# Patient Record
Sex: FEMALE | Race: BLACK OR AFRICAN AMERICAN | NOT HISPANIC OR LATINO | Employment: UNEMPLOYED | URBAN - METROPOLITAN AREA
[De-identification: names, ages, dates, MRNs, and addresses within clinical notes are randomized per-mention and may not be internally consistent; named-entity substitution may affect disease eponyms.]

---

## 2017-07-31 ENCOUNTER — TELEPHONE (OUTPATIENT)
Dept: OBSTETRICS AND GYNECOLOGY | Facility: CLINIC | Age: 37
End: 2017-07-31

## 2020-06-18 DIAGNOSIS — M79.604 PAIN IN RIGHT LEG: Primary | ICD-10-CM

## 2022-03-08 ENCOUNTER — HOSPITAL ENCOUNTER (EMERGENCY)
Facility: HOSPITAL | Age: 42
Discharge: HOME | End: 2022-03-08
Attending: EMERGENCY MEDICINE
Payer: MEDICAID

## 2022-03-08 VITALS
WEIGHT: 234 LBS | HEIGHT: 62 IN | RESPIRATION RATE: 17 BRPM | SYSTOLIC BLOOD PRESSURE: 144 MMHG | BODY MASS INDEX: 43.06 KG/M2 | TEMPERATURE: 99 F | DIASTOLIC BLOOD PRESSURE: 89 MMHG | HEART RATE: 66 BPM | OXYGEN SATURATION: 99 %

## 2022-03-08 DIAGNOSIS — N93.9 VAGINAL BLEEDING: Primary | ICD-10-CM

## 2022-03-08 DIAGNOSIS — R79.1 SUPRATHERAPEUTIC INR: ICD-10-CM

## 2022-03-08 PROBLEM — I63.9 CEREBROVASCULAR ACCIDENT (CVA): Status: ACTIVE | Noted: 2020-08-23

## 2022-03-08 PROBLEM — M32.9 LUPUS: Status: ACTIVE | Noted: 2022-03-08

## 2022-03-08 PROBLEM — I10 HYPERTENSION: Status: ACTIVE | Noted: 2021-12-06

## 2022-03-08 LAB
ALBUMIN SERPL-MCNC: 3.9 G/DL (ref 3.3–5.5)
ALP SERPL-CCNC: 88 U/L (ref 42–141)
BILIRUB SERPL-MCNC: 0.5 MG/DL (ref 0.2–1.6)
BILIRUBIN, POC UA: NEGATIVE
BLOOD, POC UA: ABNORMAL
BUN SERPL-MCNC: 9 MG/DL (ref 7–22)
CALCIUM SERPL-MCNC: 9.5 MG/DL (ref 8–10.3)
CHLORIDE SERPL-SCNC: 106 MMOL/L (ref 98–108)
CLARITY, POC UA: CLEAR
COLOR, POC UA: YELLOW
CREAT SERPL-MCNC: 1 MG/DL (ref 0.6–1.2)
GLUCOSE SERPL-MCNC: 105 MG/DL (ref 73–118)
GLUCOSE, POC UA: NEGATIVE
KETONES, POC UA: NEGATIVE
LEUKOCYTE EST, POC UA: NEGATIVE
NITRITE, POC UA: NEGATIVE
PH UR STRIP: 6 [PH]
POC ALT (SGPT): 23 U/L (ref 10–47)
POC AST (SGOT): 25 U/L (ref 11–38)
POC PTINR: 5.3 (ref 0.9–1.2)
POC PTWBT: 59.2 SEC (ref 9.7–14.3)
POC TCO2: 29 MMOL/L (ref 18–33)
POTASSIUM BLD-SCNC: 3.6 MMOL/L (ref 3.6–5.1)
PROTEIN, POC UA: ABNORMAL
PROTEIN, POC: 7.8 G/DL (ref 6.4–8.1)
SAMPLE: ABNORMAL
SODIUM BLD-SCNC: 139 MMOL/L (ref 128–145)
SPECIFIC GRAVITY, POC UA: 1.02
UROBILINOGEN, POC UA: 0.2 E.U./DL

## 2022-03-08 PROCEDURE — 85610 PROTHROMBIN TIME: CPT | Mod: ER

## 2022-03-08 PROCEDURE — 81003 URINALYSIS AUTO W/O SCOPE: CPT | Mod: ER

## 2022-03-08 PROCEDURE — 85025 COMPLETE CBC W/AUTO DIFF WBC: CPT | Mod: ER

## 2022-03-08 PROCEDURE — 99284 EMERGENCY DEPT VISIT MOD MDM: CPT | Mod: ER

## 2022-03-08 PROCEDURE — 80053 COMPREHEN METABOLIC PANEL: CPT | Mod: ER

## 2022-03-08 RX ORDER — LANOLIN ALCOHOL/MO/W.PET/CERES
CREAM (GRAM) TOPICAL
COMMUNITY

## 2022-03-08 RX ORDER — WARFARIN SODIUM 5 MG/1
5 TABLET ORAL DAILY
COMMUNITY

## 2022-03-08 RX ORDER — GABAPENTIN 300 MG/1
300 CAPSULE ORAL 2 TIMES DAILY PRN
COMMUNITY

## 2022-03-08 NOTE — ED PROVIDER NOTES
Encounter Date: 3/8/2022       History     Chief Complaint   Patient presents with    Vaginal Bleeding     Since 2300 vaginal bleeding after intercourse with .  States changed 4 pads today.  No dizzyness/N/V.  Abdominal cramping present along with pain to vagina.  HX of hysterectomy. Pt took her Warfarin today.      CC: Vaginal bleeding    HPI:  This is a 41-year-old female with history of CVA and lupus on Coumadin therapy presenting to the ED with vaginal bleeding that began last night after having intercourse with her .  She reports using 4 pads today.  She reports mild associated lower pelvic cramping and vaginal pain.  Denies any headache, visual disturbance, chest pain, shortness of breath, lightheadedness, dizziness.  Reports history of supracervical hysterectomy.  OBGYN is Dr. Anderson at [facility].    The history is provided by the patient. No  was used.     Review of patient's allergies indicates:  No Known Allergies  Past Medical History:   Diagnosis Date    Endometriosis     Hypertension     Lupus      Past Surgical History:   Procedure Laterality Date     SECTION      x2    CHOLECYSTECTOMY      HAND SURGERY      HYSTERECTOMY      partial done for AUB     No family history on file.  Social History     Tobacco Use    Smoking status: Current Every Day Smoker     Packs/day: 0.25   Substance Use Topics    Alcohol use: No    Drug use: No     Review of Systems   Constitutional: Negative for chills and fever.   HENT: Negative for sore throat.    Respiratory: Negative for shortness of breath.    Cardiovascular: Negative for chest pain.   Gastrointestinal: Negative for abdominal pain, nausea and vomiting.   Genitourinary: Positive for pelvic pain, vaginal bleeding and vaginal pain. Negative for dysuria.   Musculoskeletal: Negative for back pain.   Skin: Negative for rash.   Neurological: Negative for weakness.   Hematological: Does not  bruise/bleed easily.       Physical Exam     Initial Vitals [03/08/22 1541]   BP Pulse Resp Temp SpO2   (!) 153/98 76 18 98.4 °F (36.9 °C) 100 %      MAP       --         Physical Exam    Constitutional: She appears well-developed and well-nourished. She is not diaphoretic. No distress.   HENT:   Head: Normocephalic and atraumatic.   Neck:   Normal range of motion.  Cardiovascular: Normal rate, regular rhythm, normal heart sounds and intact distal pulses.   Pulmonary/Chest: Breath sounds normal. No respiratory distress.   Abdominal: Abdomen is soft. Bowel sounds are normal. There is no abdominal tenderness.   Abdomen is soft nontender.  No guarding or rigidity.   Genitourinary: There is no injury on the right labia. There is no injury on the left labia.    Vaginal bleeding present.   There is bleeding in the vagina.    No foreign body in the vagina.      Genitourinary Comments: Scant bleeding in the vagina.  No clots, tissue, pooling of blood.  No lacerations observed.  Cervix appears intact with no bleeding from the os.   exam chaperoned by RN and Dr. Dan.     Musculoskeletal:         General: Normal range of motion.      Cervical back: Normal range of motion.     Neurological: She is alert and oriented to person, place, and time.   Skin: Skin is warm and dry.   Psychiatric: She has a normal mood and affect. Her behavior is normal.         ED Course   Procedures  Labs Reviewed   POCT URINALYSIS W/O SCOPE - Abnormal; Notable for the following components:       Result Value    Blood, UA 3+ (*)     Protein, UA 1+ (*)     All other components within normal limits   ISTAT PROCEDURE - Abnormal; Notable for the following components:    POC PTWBT 59.2 (*)     POC PTINR 5.3 (*)     All other components within normal limits   POCT CBC   POCT URINALYSIS W/O SCOPE   POCT CMP   POCT PROTIME-INR   POCT CMP          Imaging Results    None          Medications - No data to display  Medical Decision Making:   ED  Management:  41-year-old female on Coumadin presenting to the ED with vaginal bleeding after having intercourse yesterday.  Her abdominal exam is reassuring.   exam with scant bleeding.  No lacerations or obvious injury visualized.  CBC without anemia.  Her INR is supratherapeutic at 5.3.  Her goal is 2-3.  She will hold her dose tomorrow and contact her Coumadin Clinic for further instruction.  I discussed the case with OBGYN on-call Dr. France who believes this patient is stable for discharge with follow-up tomorrow with her OBGYN.  Patient is agreeable to the plan of care.  Strict return precautions discussed with the patient who verbalized understanding.    This case was discussed with my attending physician who examined the patient and agrees with my plan of care.                      Clinical Impression:   Final diagnoses:  [N93.9] Vaginal bleeding (Primary)  [R79.1] Supratherapeutic INR          ED Disposition Condition    Discharge Stable        ED Prescriptions     None        Follow-up Information     Follow up With Specialties Details Why Contact Info    Yuliana Beal MD Internal Medicine, Pediatrics Schedule an appointment as soon as possible for a visit in 1 day For follow-up [address]   [address]  [facility] [address]  [phone]      Brittany Anderson MD Obstetrics and Gynecology Schedule an appointment as soon as possible for a visit in 1 day For follow-up-----vaginal bleeding [address]  [address]  [facility] [address]  [phone]      [facility] - Endo/Diabetes/Coumadin Endocrinology, Nephrology Schedule an appointment as soon as possible for a visit in 1 day For follow-up----elevated INR [address] [facility] [address]  [phone]      [facility] ED Emergency Medicine Go to  If symptoms worsen [address]  [facility] [address] [phone]           Donita Piedra NP  03/08/22 2108       Donita Piedra NP  03/08/22  2109

## 2022-03-08 NOTE — DISCHARGE INSTRUCTIONS
Hold your dose of Coumadin tomorrow.  Call your Coumadin clinic and informed them of your INR level (5.3) in the ED.  Call your OBGYN in the morning to have a follow up appointment tomorrow to re-evaluate symptoms.  Do not have sex or insert anything into the vagina.  Return to the emergency department for any new or worsening symptoms such as heavier vaginal bleeding, lightheadedness, dizziness, worsening pain, or any other concerns.

## 2022-05-31 ENCOUNTER — HOSPITAL ENCOUNTER (EMERGENCY)
Facility: HOSPITAL | Age: 42
Discharge: HOME | End: 2022-05-31
Attending: EMERGENCY MEDICINE
Payer: MEDICAID

## 2022-05-31 VITALS
HEIGHT: 62 IN | DIASTOLIC BLOOD PRESSURE: 82 MMHG | HEART RATE: 83 BPM | BODY MASS INDEX: 43.24 KG/M2 | SYSTOLIC BLOOD PRESSURE: 136 MMHG | WEIGHT: 235 LBS | RESPIRATION RATE: 16 BRPM | TEMPERATURE: 99 F | OXYGEN SATURATION: 98 %

## 2022-05-31 DIAGNOSIS — N34.2: Primary | ICD-10-CM

## 2022-05-31 LAB
B-HCG UR QL: NEGATIVE
CTP QC/QA: YES

## 2022-05-31 PROCEDURE — 63600175 PHARM REV CODE 636 W HCPCS: Mod: ER | Performed by: EMERGENCY MEDICINE

## 2022-05-31 PROCEDURE — 25000003 PHARM REV CODE 250: Mod: ER | Performed by: EMERGENCY MEDICINE

## 2022-05-31 PROCEDURE — 81025 URINE PREGNANCY TEST: CPT | Mod: ER | Performed by: NURSE PRACTITIONER

## 2022-05-31 PROCEDURE — 99284 EMERGENCY DEPT VISIT MOD MDM: CPT | Mod: ER

## 2022-05-31 PROCEDURE — 96372 THER/PROPH/DIAG INJ SC/IM: CPT | Performed by: EMERGENCY MEDICINE

## 2022-05-31 RX ORDER — DOXYCYCLINE HYCLATE 100 MG
100 TABLET ORAL
Status: COMPLETED | OUTPATIENT
Start: 2022-05-31 | End: 2022-05-31

## 2022-05-31 RX ORDER — DOXYCYCLINE 100 MG/1
100 CAPSULE ORAL 2 TIMES DAILY
Qty: 20 CAPSULE | Refills: 0 | Status: SHIPPED | OUTPATIENT
Start: 2022-05-31 | End: 2022-06-10

## 2022-05-31 RX ORDER — CEFTRIAXONE 500 MG/1
500 INJECTION, POWDER, FOR SOLUTION INTRAMUSCULAR; INTRAVENOUS
Status: COMPLETED | OUTPATIENT
Start: 2022-05-31 | End: 2022-05-31

## 2022-05-31 RX ADMIN — CEFTRIAXONE SODIUM 500 MG: 500 INJECTION, POWDER, FOR SOLUTION INTRAMUSCULAR; INTRAVENOUS at 11:05

## 2022-05-31 RX ADMIN — DOXYCYCLINE HYCLATE 100 MG: 100 TABLET, FILM COATED ORAL at 11:05

## 2022-05-31 NOTE — ED PROVIDER NOTES
Encounter Date: 2022    SCRIBE #1 NOTE: I, Sadaf Swan, am scribing for, and in the presence of,  Ruben Hanna MD. I have scribed the following portions of the note - Other sections scribed: HPI, ROS, PE.       History     Chief Complaint   Patient presents with    Groin Swelling     Swelling started yesterday right side of vagina, no burning , swollen, tender and painful, thinks it reaction from mupirocin. Was told to use cream for cyst that she has on vagina, has had cyst for 3 weeks.      Karla Cruz is a 41 y.o. female with a history of Lupus, Hypertension and Endometriosis who presents to the ED for evaluation of a cyst on the vagina which became irritated yesterday. The patient reports that the cyst began 5 weeks ago and she visited her primary care provider 3 weeks ago. She stated that she was prescribed topical medication for the cyst. Additionally, she reports associated symptoms of worsening swelling around the cyst and dysuria. Denies vaginal discharge and suprapubic abdominal pain.The patients attests to recent negative STD tests from her primary care provider. No other alleviating or exacerbating factors. No known allergies.       The history is provided by the patient. No  was used.     Review of patient's allergies indicates:  No Known Allergies  Past Medical History:   Diagnosis Date    Endometriosis     Hypertension     Lupus      Past Surgical History:   Procedure Laterality Date     SECTION      x2    CHOLECYSTECTOMY      HAND SURGERY      HYSTERECTOMY      partial done for AUB     History reviewed. No pertinent family history.  Social History     Tobacco Use    Smoking status: Current Every Day Smoker     Packs/day: 0.25   Substance Use Topics    Alcohol use: No    Drug use: No     Review of Systems   Constitutional: Negative for fever.   HENT: Negative for sore throat.    Eyes: Negative for visual disturbance.   Respiratory:  Negative for shortness of breath.    Cardiovascular: Negative for chest pain.   Gastrointestinal: Negative for abdominal pain.   Genitourinary: Positive for dysuria. Negative for difficulty urinating and vaginal discharge.        (+) Cyst with swelling.   Musculoskeletal: Negative for back pain.   Skin: Negative for rash.   Neurological: Negative for headaches.       Physical Exam     Initial Vitals [05/31/22 0935]   BP Pulse Resp Temp SpO2   (!) 145/85 85 19 98.8 °F (37.1 °C) 97 %      MAP       --         Physical Exam    Constitutional: She appears well-developed and well-nourished.   HENT:   Head: Normocephalic and atraumatic.   Eyes: EOM are normal. Pupils are equal, round, and reactive to light.   Neck: Neck supple. No thyromegaly present. No JVD present.   Normal range of motion.  Cardiovascular: Normal rate and regular rhythm. Exam reveals no gallop and no friction rub.    No murmur heard.  Abdominal: Abdomen is soft. There is no abdominal tenderness.   Genitourinary:    Genitourinary Comments: No vaginal discharge. Right skene's gland swollen and tender. Sub centimeter. Non fluctuant.      Musculoskeletal:      Cervical back: Normal range of motion and neck supple.     Neurological: She is alert and oriented to person, place, and time. She has normal strength. GCS eye subscore is 4. GCS verbal subscore is 5. GCS motor subscore is 6.   Skin: Skin is warm and dry. Capillary refill takes less than 2 seconds.         ED Course   Procedures  Labs Reviewed   POCT URINE PREGNANCY          Imaging Results    None          Medications   cefTRIAXone injection 500 mg (500 mg Intramuscular Given 5/31/22 1110)   doxycycline tablet 100 mg (100 mg Oral Given 5/31/22 1109)     Medical Decision Making:   History:   Old Medical Records: I decided to obtain old medical records.  Clinical Tests:   Lab Tests: Ordered and Reviewed          Scribe Attestation:   Scribe #1: I performed the above scribed service and the  documentation accurately describes the services I performed. I attest to the accuracy of the note.               I, Ruben Hanna, personally performed the services described in this documentation. All medical record entries made by the scribe were at my direction and in my presence.  I have reviewed the chart and agree that the record reflects my personal performance and is accurate and complete    Clinical Impression:   Final diagnoses:  [N34.2] [place]'s gland inflammation (Primary)          ED Disposition Condition    Discharge Stable        ED Prescriptions     None        Follow-up Information     Follow up With Specialties Details Why Contact Info    Brittany Anderson MD Obstetrics and Gynecology Schedule an appointment as soon as possible for a visit   [address] [place] [address]  [address]  [facility] [address]  [phone]             Ruben Hanna MD  05/31/22 1133

## 2022-07-20 ENCOUNTER — HOSPITAL ENCOUNTER (EMERGENCY)
Facility: HOSPITAL | Age: 42
Discharge: HOME | End: 2022-07-20
Attending: EMERGENCY MEDICINE
Payer: MEDICAID

## 2022-07-20 VITALS
OXYGEN SATURATION: 99 % | SYSTOLIC BLOOD PRESSURE: 154 MMHG | HEIGHT: 62 IN | HEART RATE: 79 BPM | WEIGHT: 223 LBS | DIASTOLIC BLOOD PRESSURE: 98 MMHG | BODY MASS INDEX: 41.04 KG/M2 | RESPIRATION RATE: 18 BRPM | TEMPERATURE: 98 F

## 2022-07-20 DIAGNOSIS — R21 RASH: Primary | ICD-10-CM

## 2022-07-20 DIAGNOSIS — L29.9 PRURITUS: ICD-10-CM

## 2022-07-20 PROCEDURE — 96372 THER/PROPH/DIAG INJ SC/IM: CPT | Performed by: EMERGENCY MEDICINE

## 2022-07-20 PROCEDURE — 99284 EMERGENCY DEPT VISIT MOD MDM: CPT | Mod: ER

## 2022-07-20 PROCEDURE — 63600175 PHARM REV CODE 636 W HCPCS: Mod: ER | Performed by: EMERGENCY MEDICINE

## 2022-07-20 RX ORDER — LEVOCETIRIZINE DIHYDROCHLORIDE 5 MG/1
5 TABLET, FILM COATED ORAL NIGHTLY
Qty: 14 TABLET | Refills: 0 | Status: SHIPPED | OUTPATIENT
Start: 2022-07-20 | End: 2022-08-03

## 2022-07-20 RX ORDER — DEXAMETHASONE SODIUM PHOSPHATE 4 MG/ML
8 INJECTION, SOLUTION INTRA-ARTICULAR; INTRALESIONAL; INTRAMUSCULAR; INTRAVENOUS; SOFT TISSUE
Status: COMPLETED | OUTPATIENT
Start: 2022-07-20 | End: 2022-07-20

## 2022-07-20 RX ORDER — PERMETHRIN 50 MG/G
CREAM TOPICAL
Qty: 90 G | Refills: 0 | Status: SHIPPED | OUTPATIENT
Start: 2022-07-20

## 2022-07-20 RX ADMIN — DEXAMETHASONE SODIUM PHOSPHATE 8 MG: 4 INJECTION INTRA-ARTICULAR; INTRALESIONAL; INTRAMUSCULAR; INTRAVENOUS; SOFT TISSUE at 07:07

## 2022-07-20 NOTE — DISCHARGE INSTRUCTIONS
The cause of your rash is uncertain.  You were treated with steroids and have 2 medications for home use.  If you are not better with this treatment, see your primary care doctor.

## 2022-07-20 NOTE — ED PROVIDER NOTES
Encounter Date: 2022    SCRIBE #1 NOTE: I, Manohar Hanson, am scribing for, and in the presence of,  Albertina Greenberg MD. I have scribed the following portions of the note - Other sections scribed: HPI,ROS,PE.       History     Chief Complaint   Patient presents with    Rash     Reports rash to torso and arms for 3 days after spending night at friends house.     Patient is a 41 year old female with PMHx of HTN who presents to ED with complaints of an acute rash to the torso, arms, and various other areas onset 3 days ago. She notes rash is itchy and appeared all at once after she spent a day at her friend's house. She states that her friend doesn't have any pets. Has not taken any medication for relief of symptoms. She used to smoke tobacco but notes that she has quit. No other complaints at this time.     The history is provided by the patient. No  was used.     Review of patient's allergies indicates:  No Known Allergies  Past Medical History:   Diagnosis Date    Endometriosis     Hypertension     Lupus      Past Surgical History:   Procedure Laterality Date     SECTION      x2    CHOLECYSTECTOMY      HAND SURGERY      HYSTERECTOMY      partial done for AUB     No family history on file.  Social History     Tobacco Use    Smoking status: Current Every Day Smoker     Packs/day: 0.25   Substance Use Topics    Alcohol use: No    Drug use: No     Review of Systems   Constitutional: Negative for activity change, appetite change, chills and fever.   HENT: Negative for congestion, rhinorrhea, sneezing and sore throat.    Respiratory: Negative for cough, choking, shortness of breath and wheezing.    Cardiovascular: Negative for chest pain and palpitations.   Gastrointestinal: Negative for abdominal pain, diarrhea, nausea and vomiting.   Skin: Positive for rash.   Neurological: Negative for dizziness, syncope, light-headedness and headaches.   All other systems reviewed and are  negative.      Physical Exam     Initial Vitals [07/20/22 0634]   BP Pulse Resp Temp SpO2   (!) 154/98 79 18 98.3 °F (36.8 °C) 99 %      MAP       --         Physical Exam    Nursing note and vitals reviewed.  Constitutional: She appears well-developed and well-nourished. No distress.   HENT:   Head: Normocephalic and atraumatic.   Eyes: Conjunctivae are normal.   Neck:   Normal range of motion.  Cardiovascular: Normal rate, regular rhythm and normal heart sounds.   No murmur heard.  Pulmonary/Chest: Breath sounds normal. No respiratory distress.   Abdominal: Bowel sounds are normal. She exhibits no distension.   Musculoskeletal:         General: Normal range of motion.      Cervical back: Normal range of motion.     Neurological: She is alert and oriented to person, place, and time.   Skin: Skin is warm and dry. Rash noted. Rash is papular.   Scattered papular lesions that are more concentrated on the trunk but also present on other extremities.   Psychiatric: She has a normal mood and affect. Her behavior is normal.         ED Course   Procedures  Labs Reviewed - No data to display       Imaging Results    None          Medications   dexamethasone injection 8 mg (8 mg Intramuscular Given 7/20/22 0736)     Medical Decision Making:   History:   Old Medical Records: I decided to obtain old medical records.  ED Management:  Non-specific pruritic rash - will treat IM steroids, oral antihistamine, and topical benadryl or steroid.  Follow up as needed.          Scribe Attestation:   Scribe #1: I performed the above scribed service and the documentation accurately describes the services I performed. I attest to the accuracy of the note.                 I, Dr. Albertina Greenberg, personally performed the services described in this documentation.   All medical record entries made by the scribe were at my direction and in my presence.   I have reviewed the chart and agree that the record is accurate and complete.   Albertina Greenberg MD.   7:31 AM 07/20/2022   Clinical Impression:   Final diagnoses:  [R21] Rash (Primary)  [L29.9] Pruritus          ED Disposition Condition    Discharge Stable        ED Prescriptions     Medication Sig Dispense Start Date End Date Auth. Provider    levocetirizine (XYZAL) 5 MG tablet Take 1 tablet (5 mg total) by mouth every evening. for 14 days 14 tablet 7/20/2022 8/3/2022 Albertina Greenberg MD    permethrin (ELIMITE) 5 % cream Apply to affected area once.  Leave on for 8-10 hours then wash off.  Repeat in 1 week if needed. 90 g 7/20/2022  Albertina Greenberg MD        Follow-up Information     Follow up With Specialties Details Why Contact Info    Yuliana Beal MD Internal Medicine, Pediatrics In 1 week if not better [address]  [facility] [address]  [phone]             Albertina Greenberg MD  07/20/22 1455

## 2023-04-19 ENCOUNTER — HOSPITAL ENCOUNTER (EMERGENCY)
Facility: HOSPITAL | Age: 43
Discharge: HOME | End: 2023-04-19
Attending: EMERGENCY MEDICINE
Payer: MEDICAID

## 2023-04-19 VITALS
HEART RATE: 72 BPM | SYSTOLIC BLOOD PRESSURE: 156 MMHG | BODY MASS INDEX: 33.31 KG/M2 | DIASTOLIC BLOOD PRESSURE: 97 MMHG | WEIGHT: 188 LBS | OXYGEN SATURATION: 96 % | RESPIRATION RATE: 18 BRPM | HEIGHT: 63 IN | TEMPERATURE: 99 F

## 2023-04-19 DIAGNOSIS — N64.4 BREAST PAIN, RIGHT: Primary | ICD-10-CM

## 2023-04-19 DIAGNOSIS — N61.0 MASTITIS: ICD-10-CM

## 2023-04-19 DIAGNOSIS — J02.9 SORE THROAT: ICD-10-CM

## 2023-04-19 LAB
CTP QC/QA: YES
MOLECULAR STREP A: NEGATIVE

## 2023-04-19 PROCEDURE — 87651 STREP A DNA AMP PROBE: CPT

## 2023-04-19 PROCEDURE — 99284 EMERGENCY DEPT VISIT MOD MDM: CPT

## 2023-04-19 PROCEDURE — 25000003 PHARM REV CODE 250: Performed by: NURSE PRACTITIONER

## 2023-04-19 RX ORDER — AMOXICILLIN AND CLAVULANATE POTASSIUM 875; 125 MG/1; MG/1
1 TABLET, FILM COATED ORAL
Status: COMPLETED | OUTPATIENT
Start: 2023-04-19 | End: 2023-04-19

## 2023-04-19 RX ORDER — AMOXICILLIN AND CLAVULANATE POTASSIUM 875; 125 MG/1; MG/1
1 TABLET, FILM COATED ORAL 2 TIMES DAILY
Qty: 28 TABLET | Refills: 0 | Status: SHIPPED | OUTPATIENT
Start: 2023-04-19 | End: 2023-05-03

## 2023-04-19 RX ORDER — LIDOCAINE HYDROCHLORIDE 20 MG/ML
15 SOLUTION OROPHARYNGEAL ONCE
Status: COMPLETED | OUTPATIENT
Start: 2023-04-19 | End: 2023-04-19

## 2023-04-19 RX ORDER — MAG HYDROX/ALUMINUM HYD/SIMETH 200-200-20
30 SUSPENSION, ORAL (FINAL DOSE FORM) ORAL ONCE
Status: COMPLETED | OUTPATIENT
Start: 2023-04-19 | End: 2023-04-19

## 2023-04-19 RX ORDER — LIDOCAINE 50 MG/G
1 PATCH TOPICAL
Status: DISCONTINUED | OUTPATIENT
Start: 2023-04-19 | End: 2023-04-19 | Stop reason: HOSPADM

## 2023-04-19 RX ORDER — LIDOCAINE 50 MG/G
1 PATCH TOPICAL DAILY
Qty: 15 PATCH | Refills: 0 | Status: SHIPPED | OUTPATIENT
Start: 2023-04-19

## 2023-04-19 RX ADMIN — LIDOCAINE HYDROCHLORIDE 15 ML: 20 SOLUTION ORAL; TOPICAL at 06:04

## 2023-04-19 RX ADMIN — AMOXICILLIN AND CLAVULANATE POTASSIUM 1 TABLET: 875; 125 TABLET, FILM COATED ORAL at 07:04

## 2023-04-19 RX ADMIN — ALUMINUM HYDROXIDE, MAGNESIUM HYDROXIDE, AND DIMETHICONE 30 ML: 200; 20; 200 SUSPENSION ORAL at 06:04

## 2023-04-19 RX ADMIN — LIDOCAINE 1 PATCH: 50 PATCH TOPICAL at 07:04

## 2023-04-19 NOTE — ED TRIAGE NOTES
43 yo female presents to ED with c/o right breast pain (around the nipple) and throat pain. Pt reports that she began having nipple pain around 2200 last night; reports throat pain for 2 days. Pt describes pain as sharp; rates pain at an 8 on a PRS of 0-10. Pt reports a PMH of lupus and HTN; also states that she had milk deposits in her breasts that were lumpy and painful a year ago. Pt denies any CP, SOB, cough, N/V/D, or anyb other symptoms.

## 2023-04-19 NOTE — ED PROVIDER NOTES
Encounter Date: 4/19/2023    SCRIBE #1 NOTE: I, Linda Sprague, am scribing for, and in the presence of,  Alejandra Adams NP. I have scribed the following portions of the note - Other sections scribed: HPI, ROS.     History     Chief Complaint   Patient presents with    Breast Pain    Sore Throat     Pt presents to ED c/o right breast pain area the nipple area, described as shooting started around 2200 last night.  Denies cp, sob, or any other associated symptoms.  Pt reports taking Tylenol last night with some relief.    Pt also c/o sore throat x 2 days.  Denies cough, congestion, fever, chills or any other symptoms.   Pt reports on blood thinners for stroke 8/2021.  Hx of lupus and htn.     Pt reports her grandmother passed away this morning.       CC: right nipple pain    HPI: This is a 42 y.o.female patient, with a PMHx of HTN, Endometriosis, and Lupus, presenting to the ED for further evaluation of non-radiating right nipple pain beginning last night at 10:00 PM. Patient reports associated shooting pain with hard sensation to touch (of right nipple). Patient states she didn't realize the pain until early this morning as she was upset about her grandmother passing away this morning. Patient denies any associated breast pain and states pain is strictly present on the right nipple. Patient reports history of milk deposits in bilateral breasts in the past and states last episode was 1.5 years ago. Patient states she was placed on antibiotics and her symptoms subsided overtime. Patient reports her physician told her such episodes of milk deposits may reoccur. Patient denies actively nursing as her children are all older now. Patient states associated symptoms of sore throat beginning 2 days ago. Patient denies any fever, chills, shortness of breath, chest pain, abdominal pain, rash, headaches, congestion, rhinorrhea, cough, nausea, vomiting, diarrhea, dysuria, or any other associated symptoms. No prior Tx. No alleviating or  aggravating factors. No known drug allergies.        The history is provided by the patient. No  was used.   Review of patient's allergies indicates:  No Known Allergies  Past Medical History:   Diagnosis Date    Endometriosis     Hypertension     Lupus      Past Surgical History:   Procedure Laterality Date     SECTION      x2    CHOLECYSTECTOMY      HAND SURGERY      HYSTERECTOMY      partial done for AUB     No family history on file.  Social History     Tobacco Use    Smoking status: Every Day     Packs/day: 0.25     Types: Cigarettes   Substance Use Topics    Alcohol use: No    Drug use: No     Review of Systems   Constitutional:  Negative for chills and fever.   HENT:  Positive for sore throat. Negative for congestion and rhinorrhea.    Eyes:  Negative for visual disturbance.   Respiratory:  Negative for cough and shortness of breath.    Cardiovascular:  Negative for chest pain.   Gastrointestinal:  Negative for abdominal pain, diarrhea, nausea and vomiting.   Genitourinary:  Negative for difficulty urinating and dysuria.   Musculoskeletal:  Negative for back pain.        (+) non-radiating right nipple pain   Skin:  Negative for rash.   Neurological:  Negative for headaches.     Physical Exam     Initial Vitals [23 0529]   BP Pulse Resp Temp SpO2   (!) 156/97 72 18 98.8 °F (37.1 °C) 96 %      MAP       --         Physical Exam    Nursing note and vitals reviewed.  Constitutional: She appears well-developed and well-nourished. She is not diaphoretic. She is active and cooperative.  Non-toxic appearance.   Eyes: Conjunctivae and EOM are normal. Pupils are equal, round, and reactive to light.   Neck: Neck supple.   Normal range of motion.  Pulmonary/Chest: Effort normal. No respiratory distress.   Right breast exhibits tenderness. Right breast exhibits no inverted nipple, no mass, no nipple discharge and no skin change. Left breast exhibits no inverted nipple, no mass, no nipple  discharge, no skin change and no tenderness. No breast swelling or bleeding. Breasts are symmetrical.   Genitourinary: No breast swelling or bleeding.   Musculoskeletal:         General: Normal range of motion.      Cervical back: Normal range of motion and neck supple.     Neurological: She is alert and oriented to person, place, and time. She has normal strength.   Skin: Skin is warm and dry.   Psychiatric: She has a normal mood and affect.       ED Course   Procedures  Labs Reviewed   POCT STREP A MOLECULAR     EKG Readings: (Independently Interpreted)   Normal sinus rhythm with ventricular rate of 70 beats per minute.  No ST elevations or depressions.     Imaging Results    None          Medications   aluminum-magnesium hydroxide-simethicone 200-200-20 mg/5 mL suspension 30 mL (30 mLs Oral Given 4/19/23 0616)     And   LIDOcaine HCl 2% oral solution 15 mL (15 mLs Oral Given 4/19/23 0616)   amoxicillin-clavulanate 875-125mg per tablet 1 tablet (1 tablet Oral Given 4/19/23 0703)     Medical Decision Making:   History:   Old Medical Records: I decided to obtain old medical records.  Differential Diagnosis:   Costochondritis, shingles, pleurisy, mastitis, cysts/milk deposits  Clinical Tests:   Lab Tests: Ordered and Reviewed  ED Management:  This is an urgent evaluation of a 42-year-old female that presents to the emergency room complaining of right nipple pain that started last night.  Patient reports chronic breast sensitivity and an episode of mastitis that occurred approximately 1.5 years ago.  Patient relates that there were multiple milk deposits found on a previous mammogram and her breast pain resolved after being treated with antibiotics.  Patient reports that pain is only over the right nipple alone.  Patient also reports that the nipple is hardened.  On my exam, there was no erythema, warmth, induration, or palpable masses.  Patient is tender over the right nipple and over right breast tissue at 7-8  o'clock.  There is no evidence to support underlying abscess or severe infection at this time.  Presumably this is an early mastitis and will treat with Augmentin course.  Recommended follow-up with her primary care doctor or gynecologist for re-evaluation and possibly a repeat mammogram if breast pain does not resolve within the next 2-3 days.  Return precautions were given for any new or worsening symptoms or concerns.  Of note, the patient also reported a sore throat.  She does have bilateral +1 tonsillar enlargement with scant exudates.  No evidence of airway compromise, peritonsillar or retropharyngeal abscess. Her rapid strep is negative.  Likely a viral etiology, though augmentin should cover.  Rx lidocaine patch for supportive care.        Scribe Attestation:   Scribe #1: I performed the above scribed service and the documentation accurately describes the services I performed. I attest to the accuracy of the note.                   Clinical Impression:   Final diagnoses:  [N64.4] Breast pain, right (Primary)  [J02.9] Sore throat  [N61.0] Mastitis     I, Alejandra Adams, personally performed the services described in this documentation. All medical record entries made by the scribe were at my direction and in my presence. I have reviewed the chart and agree that the record reflects my personal performance and is accurate and complete.     ED Disposition Condition    Discharge Stable          ED Prescriptions       Medication Sig Dispense Start Date End Date Auth. Provider    amoxicillin-clavulanate 875-125mg (AUGMENTIN) 875-125 mg per tablet Take 1 tablet by mouth 2 (two) times daily. for 14 days 28 tablet 4/19/2023 5/3/2023 Alejandra Adams NP    LIDOcaine (LIDODERM) 5 % Place 1 patch onto the skin once daily. Remove & Discard patch within 12 hours or as directed by MD 15 patch 4/19/2023 -- Alejandra Adams NP          Follow-up Information       Follow up With Specialties Details Why Contact Info    Yuliana Beal MD  Internal Medicine, Pediatrics In 3 days As needed [address]  [facility] [address]  [phone]      [facility] - Emergency Dept Emergency Medicine  As needed, If symptoms worsen [address]  [facility] [id number] [phone]             Alejandra Adams, PHILL  04/19/23 9329       Alejandra Adams NP  04/19/23 3203

## 2023-09-28 ENCOUNTER — HOSPITAL ENCOUNTER (EMERGENCY)
Facility: HOSPITAL | Age: 43
Discharge: HOME | End: 2023-09-28
Attending: EMERGENCY MEDICINE
Payer: MEDICAID

## 2023-09-28 VITALS
HEIGHT: 63 IN | DIASTOLIC BLOOD PRESSURE: 99 MMHG | HEART RATE: 79 BPM | BODY MASS INDEX: 30.65 KG/M2 | SYSTOLIC BLOOD PRESSURE: 172 MMHG | OXYGEN SATURATION: 100 % | RESPIRATION RATE: 20 BRPM | TEMPERATURE: 98 F | WEIGHT: 173 LBS

## 2023-09-28 DIAGNOSIS — J01.00 ACUTE MAXILLARY SINUSITIS, RECURRENCE NOT SPECIFIED: Primary | ICD-10-CM

## 2023-09-28 PROCEDURE — 96374 THER/PROPH/DIAG INJ IV PUSH: CPT | Mod: ER

## 2023-09-28 PROCEDURE — 63600175 PHARM REV CODE 636 W HCPCS: Mod: ER | Performed by: NURSE PRACTITIONER

## 2023-09-28 PROCEDURE — 96372 THER/PROPH/DIAG INJ SC/IM: CPT | Mod: 59 | Performed by: NURSE PRACTITIONER

## 2023-09-28 PROCEDURE — 99284 EMERGENCY DEPT VISIT MOD MDM: CPT | Mod: 25,ER

## 2023-09-28 RX ORDER — FLUTICASONE PROPIONATE 50 MCG
1 SPRAY, SUSPENSION (ML) NASAL 2 TIMES DAILY PRN
Qty: 15 G | Refills: 0 | Status: SHIPPED | OUTPATIENT
Start: 2023-09-28

## 2023-09-28 RX ORDER — DEXAMETHASONE SODIUM PHOSPHATE 4 MG/ML
12 INJECTION, SOLUTION INTRA-ARTICULAR; INTRALESIONAL; INTRAMUSCULAR; INTRAVENOUS; SOFT TISSUE
Status: COMPLETED | OUTPATIENT
Start: 2023-09-28 | End: 2023-09-28

## 2023-09-28 RX ORDER — AMOXICILLIN AND CLAVULANATE POTASSIUM 875; 125 MG/1; MG/1
1 TABLET, FILM COATED ORAL EVERY 12 HOURS
Qty: 14 TABLET | Refills: 0 | Status: SHIPPED | OUTPATIENT
Start: 2023-09-28 | End: 2023-10-05

## 2023-09-28 RX ADMIN — DEXAMETHASONE SODIUM PHOSPHATE 12 MG: 4 INJECTION, SOLUTION INTRA-ARTICULAR; INTRALESIONAL; INTRAMUSCULAR; INTRAVENOUS; SOFT TISSUE at 05:09

## 2023-09-28 NOTE — DISCHARGE INSTRUCTIONS
Take antibiotics as prescribed until they are gone.  You should not have any left over even if you feel better.    Thank you for coming to our Emergency Department today. It is important to remember that some problems are difficult to diagnose and may not be found during your first visit. Be sure to follow up with your primary care doctor.  If you do not have one, you may contact the one listed on your discharge paperwork or you may also call the [facility] Appointment Desk at [phone] to schedule an appointment with one.     Return to the ER with any questions/concerns, new/concerning symptoms, worsening or failure to improve. Do not drive or make any important decisions for 24 hours if you have received any pain medications, sedatives or mood altering drugs during your ER visit.

## 2023-09-28 NOTE — ED PROVIDER NOTES
Encounter Date: 2023    SCRIBE #1 NOTE: ILissy, am scribing for, and in the presence of,  Rubens Quintanilla NP. I have scribed the following portions of the note - Other sections scribed: HPI, ROS.       History     Chief Complaint   Patient presents with    Facial Swelling     Acute onset of left facial swelling for approximately a day. Endorses pain as well. Denies any new facial skin products. No treatment w/ at home treatment and OTC medication.     URI     Sinus congestion for approximately a week.     42 year old male with past medical history of CVA, Lupus, Hypertension, Hyperlipidemia, S/P Lap sleeve gastrectomy presenting to the Emergency room for further evaluation of acute onset left side facial pain and swelling for 1 day. Denies new facial products. Patient reports 2 weeks of initial sore throat and nasal congestion with at home treatment of OTC medications with no relief. Patient denies history of Diabetes mellitus or steroid use. No other exacerbating or alleviating factors. NKDA. Denies other symptoms. No further complaints at present time.       The history is provided by the patient. No  was used.     Review of patient's allergies indicates:  No Known Allergies  Past Medical History:   Diagnosis Date    Endometriosis     Hypertension     Lupus      Past Surgical History:   Procedure Laterality Date     SECTION      x2    CHOLECYSTECTOMY      HAND SURGERY      HYSTERECTOMY      partial done for AUB     No family history on file.  Social History     Tobacco Use    Smoking status: Every Day     Current packs/day: 0.25     Types: Cigarettes   Substance Use Topics    Alcohol use: No    Drug use: No     Review of Systems   Constitutional:  Negative for fever.   HENT:  Positive for congestion, facial swelling, sinus pain and sore throat.    Eyes:  Negative for visual disturbance.   Respiratory:  Negative for shortness of breath.    Cardiovascular:  Negative for  chest pain.   Gastrointestinal:  Negative for abdominal pain.   Genitourinary:  Negative for difficulty urinating.   Musculoskeletal:  Negative for back pain.   Skin:  Negative for rash.   Neurological:  Negative for headaches.       Physical Exam     Initial Vitals [09/28/23 1725]   BP Pulse Resp Temp SpO2   (!) 162/106 82 18 98.2 °F (36.8 °C) 100 %      MAP       --         Physical Exam    Nursing note and vitals reviewed.  Constitutional: She appears well-developed and well-nourished. She is not diaphoretic. No distress.   HENT:   Head: Normocephalic and atraumatic.   Right Ear: External ear normal.   Left Ear: External ear normal.   Nose: Right sinus exhibits no maxillary sinus tenderness and no frontal sinus tenderness. Left sinus exhibits maxillary sinus tenderness. Left sinus exhibits no frontal sinus tenderness.   Mild edema in the left facial area inferior to the periorbital area.  There is left maxillary sinus tenderness.   Eyes: Conjunctivae and EOM are normal. Right eye exhibits no discharge. Left eye exhibits no discharge.   Neck: Neck supple. No tracheal deviation present.   Normal range of motion.  Cardiovascular:  Normal rate.           Pulmonary/Chest: No stridor. No respiratory distress.   Abdominal: Abdomen is soft. She exhibits no distension. There is no abdominal tenderness.   Musculoskeletal:         General: No tenderness. Normal range of motion.      Cervical back: Normal range of motion and neck supple.     Neurological: She is alert and oriented to person, place, and time. She has normal strength. No cranial nerve deficit or sensory deficit.   Skin: Skin is warm and dry.   Psychiatric: She has a normal mood and affect. Her behavior is normal. Judgment and thought content normal.         ED Course   Procedures  Labs Reviewed - No data to display       Imaging Results    None          Medications   dexAMETHasone injection 12 mg (12 mg Intramuscular Given 9/28/23 4789)     Medical Decision  Making  42-year-old female presenting for evaluation of left sided facial pain and edema that began recently.  She has had 2 weeks of sinus congestion and has tried multiple over-the-counter medications.  The affected area became painful recently and she noticed the edema in her face at the same time.  There is left maxillary sinus tenderness.    Risk  Prescription drug management.          Rubens GUERRA NP, personally performed the services described in this documentation.  All medical record entries made by the scribe were at my direction and in my presence.  I have reviewed the chart and agree that the record reflects my personal performance and is accurate and complete.      Scribe Attestation:   Scribe #1: I performed the above scribed service and the documentation accurately describes the services I performed. I attest to the accuracy of the note.                        Clinical Impression:   Final diagnoses:  [J01.00] Acute maxillary sinusitis, recurrence not specified (Primary)        ED Disposition Condition    Discharge Stable          ED Prescriptions       Medication Sig Dispense Start Date End Date Auth. Provider    amoxicillin-clavulanate 875-125mg (AUGMENTIN) 875-125 mg per tablet Take 1 tablet by mouth every 12 (twelve) hours. for 7 days 14 tablet 9/28/2023 10/5/2023 Rubens Quintanilla, PHILL    fluticasone propionate (FLONASE) 50 mcg/actuation nasal spray 1 spray (50 mcg total) by Each Nostril route 2 (two) times daily as needed for Rhinitis or Allergies. 15 g 9/28/2023 -- Rubens Quintanilla, PHILL          Follow-up Information    None          Rubens Quintanilla NP  10/04/23 9336

## 2024-02-25 ENCOUNTER — HOSPITAL ENCOUNTER (EMERGENCY)
Facility: HOSPITAL | Age: 44
Discharge: HOME | End: 2024-02-25
Attending: EMERGENCY MEDICINE
Payer: MEDICAID

## 2024-02-25 VITALS
TEMPERATURE: 98 F | HEART RATE: 63 BPM | DIASTOLIC BLOOD PRESSURE: 115 MMHG | RESPIRATION RATE: 18 BRPM | WEIGHT: 150 LBS | SYSTOLIC BLOOD PRESSURE: 183 MMHG | BODY MASS INDEX: 26.58 KG/M2 | OXYGEN SATURATION: 100 % | HEIGHT: 63 IN

## 2024-02-25 DIAGNOSIS — R30.0 DYSURIA: Primary | ICD-10-CM

## 2024-02-25 LAB
B-HCG UR QL: NEGATIVE
BILIRUBIN, POC UA: NEGATIVE
BLOOD, POC UA: ABNORMAL
CLARITY, POC UA: CLEAR
COLOR, POC UA: YELLOW
CTP QC/QA: YES
GLUCOSE, POC UA: NEGATIVE
KETONES, POC UA: ABNORMAL
LEUKOCYTE EST, POC UA: NEGATIVE
NITRITE, POC UA: NEGATIVE
PH UR STRIP: 6 [PH]
PROTEIN, POC UA: ABNORMAL
SPECIFIC GRAVITY, POC UA: >=1.03
UROBILINOGEN, POC UA: 1 E.U./DL

## 2024-02-25 PROCEDURE — 81025 URINE PREGNANCY TEST: CPT | Mod: ER | Performed by: NURSE PRACTITIONER

## 2024-02-25 PROCEDURE — 81025 URINE PREGNANCY TEST: CPT | Mod: ER

## 2024-02-25 PROCEDURE — 81514 NFCT DS BV&VAGINITIS DNA ALG: CPT | Performed by: NURSE PRACTITIONER

## 2024-02-25 PROCEDURE — 87491 CHLMYD TRACH DNA AMP PROBE: CPT | Performed by: NURSE PRACTITIONER

## 2024-02-25 PROCEDURE — 99282 EMERGENCY DEPT VISIT SF MDM: CPT | Mod: 25,ER

## 2024-02-25 PROCEDURE — 87086 URINE CULTURE/COLONY COUNT: CPT | Performed by: NURSE PRACTITIONER

## 2024-02-25 NOTE — ED PROVIDER NOTES
Encounter Date: 2024    SCRIBE #1 NOTE: I, Ryan Courtney Do, am scribing for, and in the presence of,  Judy Wasserman FNP. I have scribed the following portions of the note - Other sections scribed: HPI, ROS.       History     Chief Complaint   Patient presents with    Urinary Frequency     Pt reports urinary frequency and burning x 7 days. Pt states her doctor cannot see her until . Pt also report N/V/D x 2 days     Karla Cruz is a 43 y.o. female, with a past medical history of HTN, HLD, and pre-DM, who presents to the ED with dysuria onset 7 days ago. Patient reports associated urinary frequency with white vaginal discharge. No other exacerbating or alleviating factors. Patient denies abdominal pain, back pain, fever, or other associated symptoms.       The history is provided by the patient. No  was used.     Review of patient's allergies indicates:  No Known Allergies  Past Medical History:   Diagnosis Date    Endometriosis     Hypertension     Lupus      Past Surgical History:   Procedure Laterality Date     SECTION      x2    CHOLECYSTECTOMY      HAND SURGERY      HYSTERECTOMY      partial done for AUB     No family history on file.  Social History     Tobacco Use    Smoking status: Every Day     Current packs/day: 0.25     Types: Cigarettes   Substance Use Topics    Alcohol use: No    Drug use: No     Review of Systems   Constitutional:  Negative for fever.   HENT:  Negative for sore throat.    Respiratory:  Negative for shortness of breath.    Cardiovascular:  Negative for chest pain.   Gastrointestinal:  Negative for abdominal pain, diarrhea, nausea and vomiting.   Genitourinary:  Positive for dysuria and frequency.   Musculoskeletal:  Negative for back pain.   Skin:  Negative for rash.   Neurological:  Negative for weakness.   Hematological:  Does not bruise/bleed easily.   All other systems reviewed and are negative.      Physical Exam     Initial  Vitals [02/25/24 0826]   BP Pulse Resp Temp SpO2   (!) 183/115 63 18 98.2 °F (36.8 °C) 100 %      MAP       --         Physical Exam    Nursing note and vitals reviewed.  Constitutional: She appears well-developed and well-nourished.   HENT:   Head: Normocephalic and atraumatic.   Eyes: Conjunctivae and EOM are normal. Pupils are equal, round, and reactive to light.   Neck:   Normal range of motion.  Cardiovascular:  Normal rate, regular rhythm, normal heart sounds and intact distal pulses.     Exam reveals no gallop and no friction rub.       No murmur heard.  Pulmonary/Chest: Breath sounds normal. No respiratory distress. She has no wheezes. She has no rhonchi. She has no rales. She exhibits no tenderness.   Abdominal: Abdomen is soft. Bowel sounds are normal. She exhibits no distension and no mass. There is no abdominal tenderness. There is no rebound and no guarding.   Musculoskeletal:         General: No tenderness or edema. Normal range of motion.      Cervical back: Normal range of motion.     Neurological: She is alert and oriented to person, place, and time. She has normal strength. GCS score is 15. GCS eye subscore is 4. GCS verbal subscore is 5. GCS motor subscore is 6.   Skin: Skin is warm. Capillary refill takes less than 2 seconds. No rash noted. No erythema.   Psychiatric: She has a normal mood and affect.       ED Course   Procedures  Labs Reviewed   POCT URINALYSIS W/O SCOPE - Abnormal; Notable for the following components:       Result Value    Ketones, UA Trace (*)     Spec Grav UA >=1.030 (*)     Blood, UA Trace-lysed (*)     Protein, UA Trace (*)     All other components within normal limits   CULTURE, URINE   C. TRACHOMATIS/N. GONORRHOEAE BY AMP DNA   VAGINOSIS SCREEN BY DNA PROBE   POCT URINE PREGNANCY   POCT URINALYSIS(INSTRUMENT)          Imaging Results    None          Medications - No data to display  Medical Decision Making  43-year-old female which presents to the emergency room with  dysuria and white vaginal discharge that began 7 days ago.  Patient denies any abdominal pain, back pain, fevers or any other symptoms.  Urinalysis is negative for infection.  Culture has been sent along with GC/chlamydia and vaginosis screen.  Patient advised that we will call her if she has positive results in requires treatment. Patient given strict return precautions and voiced understanding of all discharge instructions. Pt was stable at discharge.       Differential diagnosis: UTI, pyelonephritis, gonorrhea, chlamydia, bacterial vaginosis    Problems Addressed:  Dysuria: acute illness or injury    Amount and/or Complexity of Data Reviewed  Labs: ordered. Decision-making details documented in ED Course.    Risk  OTC drugs.            Scribe Attestation:   Scribe #1: I performed the above scribed service and the documentation accurately describes the services I performed. I attest to the accuracy of the note.        ED Course as of 02/25/24 0907   Sun Feb 25, 2024   0852 Ketones, UA(!): Trace [AT]   0852 Spec Grav UA(!): >=1.030 [AT]   0852 Blood, UA(!): Trace-lysed [AT]   0852 Protein, UA(!): Trace [AT]      ED Course User Index  [AT] Judy Wasserman FNP          IJudy FNP, personally performed the services described in this documentation.  All medical record entries made by the scribe were at my direction and in my presence.  I have reviewed the chart and agree that the record reflects my personal performance and is accurate and complete.                   Clinical Impression:  Final diagnoses:  [R30.0] Dysuria (Primary)          ED Disposition Condition    Discharge Stable          ED Prescriptions    None       Follow-up Information       Follow up With Specialties Details Why Contact Info    Yuliana Beal MD Internal Medicine, Pediatrics Schedule an appointment as soon as possible for a visit  As needed [address]  [facility] [address]  [phone]                Judy Wasserman, [facility]  02/25/24 0907

## 2024-02-25 NOTE — Clinical Note
"Karla Antonioelaine"Anthony was seen and treated in our emergency department on 2/25/2024.  She may return to work on 02/27/2024.       If you have any questions or concerns, please don't hesitate to call.      JEOVANY JONES RN    "

## 2024-02-27 LAB
BACTERIA UR CULT: NORMAL
BACTERIAL VAGINOSIS DNA: POSITIVE
C TRACH DNA SPEC QL NAA+PROBE: NOT DETECTED
CANDIDA GLABRATA DNA: NEGATIVE
CANDIDA KRUSEI DNA: NEGATIVE
CANDIDA RRNA VAG QL PROBE: NEGATIVE
N GONORRHOEA DNA SPEC QL NAA+PROBE: NOT DETECTED
T VAGINALIS RRNA GENITAL QL PROBE: NEGATIVE

## 2024-02-28 ENCOUNTER — TELEPHONE (OUTPATIENT)
Dept: EMERGENCY MEDICINE | Facility: HOSPITAL | Age: 44
End: 2024-02-28
Payer: MEDICAID

## 2024-02-28 RX ORDER — METRONIDAZOLE 500 MG/1
500 TABLET ORAL 2 TIMES DAILY
Qty: 14 TABLET | Refills: 0 | Status: SHIPPED | OUTPATIENT
Start: 2024-02-28 | End: 2024-03-06

## 2024-04-16 ENCOUNTER — HOSPITAL ENCOUNTER (EMERGENCY)
Facility: HOSPITAL | Age: 44
Discharge: HOME | End: 2024-04-16
Attending: EMERGENCY MEDICINE

## 2024-04-16 VITALS
WEIGHT: 165 LBS | RESPIRATION RATE: 19 BRPM | TEMPERATURE: 99 F | HEART RATE: 77 BPM | DIASTOLIC BLOOD PRESSURE: 97 MMHG | OXYGEN SATURATION: 98 % | SYSTOLIC BLOOD PRESSURE: 189 MMHG | BODY MASS INDEX: 29.23 KG/M2 | HEIGHT: 63 IN

## 2024-04-16 DIAGNOSIS — M79.671 RIGHT FOOT PAIN: ICD-10-CM

## 2024-04-16 DIAGNOSIS — M72.2 PLANTAR FASCIITIS: Primary | ICD-10-CM

## 2024-04-16 LAB
ALBUMIN SERPL-MCNC: 3.7 G/DL (ref 3.3–5.5)
ALP SERPL-CCNC: 103 U/L (ref 42–141)
BILIRUB SERPL-MCNC: 0.8 MG/DL (ref 0.2–1.6)
BUN SERPL-MCNC: 11 MG/DL (ref 7–22)
CALCIUM SERPL-MCNC: 8.9 MG/DL (ref 8–10.3)
CHLORIDE SERPL-SCNC: 109 MMOL/L (ref 98–108)
CREAT SERPL-MCNC: 0.6 MG/DL (ref 0.6–1.2)
GLUCOSE SERPL-MCNC: 89 MG/DL (ref 73–118)
POC ALT (SGPT): 45 U/L (ref 10–47)
POC AST (SGOT): 26 U/L (ref 11–38)
POC TCO2: 29 MMOL/L (ref 18–33)
POTASSIUM BLD-SCNC: 3.7 MMOL/L (ref 3.6–5.1)
PROTEIN, POC: 7.2 G/DL (ref 6.4–8.1)
SODIUM BLD-SCNC: 144 MMOL/L (ref 128–145)

## 2024-04-16 PROCEDURE — 25000003 PHARM REV CODE 250: Mod: ER

## 2024-04-16 PROCEDURE — 96374 THER/PROPH/DIAG INJ IV PUSH: CPT | Mod: ER

## 2024-04-16 PROCEDURE — 80053 COMPREHEN METABOLIC PANEL: CPT | Mod: ER

## 2024-04-16 PROCEDURE — 99284 EMERGENCY DEPT VISIT MOD MDM: CPT | Mod: 25,ER

## 2024-04-16 PROCEDURE — 63600175 PHARM REV CODE 636 W HCPCS: Mod: ER

## 2024-04-16 RX ORDER — ACETAMINOPHEN 325 MG/1
650 TABLET ORAL
Status: COMPLETED | OUTPATIENT
Start: 2024-04-16 | End: 2024-04-16

## 2024-04-16 RX ORDER — DEXAMETHASONE SODIUM PHOSPHATE 4 MG/ML
8 INJECTION, SOLUTION INTRA-ARTICULAR; INTRALESIONAL; INTRAMUSCULAR; INTRAVENOUS; SOFT TISSUE
Status: COMPLETED | OUTPATIENT
Start: 2024-04-16 | End: 2024-04-16

## 2024-04-16 RX ADMIN — ACETAMINOPHEN 650 MG: 325 TABLET ORAL at 09:04

## 2024-04-16 RX ADMIN — DEXAMETHASONE SODIUM PHOSPHATE 8 MG: 4 INJECTION INTRA-ARTICULAR; INTRALESIONAL; INTRAMUSCULAR; INTRAVENOUS; SOFT TISSUE at 09:04

## 2024-04-16 NOTE — ED PROVIDER NOTES
Encounter Date: 2024       History     Chief Complaint   Patient presents with    Foot Injury     A 42 y/o female presents to the ER c/o (right) heel pain x 1 month. Denies trauma.      Patient is a 43-year-old female with a past medical history of hypertension, endometriosis, lupus, TIA currently on Eliquis who presents to the emergency room for evaluation of atraumatic right foot/heel pain worsening over the last month.  Reports being diagnosed with plantar fasciitis by her primary care.  Reports she was told to use ice and states no improvement.  Reports the pain is worse in the sole of her foot when getting out of bed in the morning.  She commonly wears slip-on shoes.  Denies numbness or tingling.  Denies swelling or redness.  Denies fever, chills.  No weakness.  No other complaints.    The history is provided by the patient.     Review of patient's allergies indicates:  No Known Allergies  Past Medical History:   Diagnosis Date    Endometriosis     Hypertension     Lupus      Past Surgical History:   Procedure Laterality Date     SECTION      x2    CHOLECYSTECTOMY      HAND SURGERY      HYSTERECTOMY      partial done for AUB     No family history on file.  Social History     Tobacco Use    Smoking status: Every Day     Current packs/day: 0.25     Types: Cigarettes   Substance Use Topics    Alcohol use: No    Drug use: No     Review of Systems   Constitutional:  Negative for chills and fever.   Respiratory:  Negative for shortness of breath.    Cardiovascular:  Negative for chest pain.   Gastrointestinal:  Negative for abdominal pain, diarrhea, nausea and vomiting.   Musculoskeletal:  Positive for arthralgias. Negative for joint swelling, neck pain and neck stiffness.   Skin:  Negative for color change.   Neurological:  Negative for dizziness, weakness, light-headedness, numbness and headaches.       Physical Exam     Initial Vitals [24 0911]   BP Pulse Resp Temp SpO2   (!) 200/129 73 19 98.6  °F (37 °C) 98 %      MAP       --         Physical Exam    Nursing note and vitals reviewed.  Constitutional: She appears well-developed and well-nourished.   HENT:   Head: Normocephalic and atraumatic.   Right Ear: External ear normal.   Left Ear: External ear normal.   Neck: Carotid bruit is not present.   Normal range of motion.  Cardiovascular:  Normal rate, regular rhythm, normal heart sounds and intact distal pulses.     Exam reveals no gallop and no friction rub.       No murmur heard.  Pulmonary/Chest: Breath sounds normal. No respiratory distress. She has no wheezes. She has no rhonchi. She has no rales.   Abdominal: Abdomen is soft. Bowel sounds are normal. She exhibits no distension. There is no abdominal tenderness. There is no rebound and no guarding.   Musculoskeletal:         General: Normal range of motion.      Cervical back: Normal range of motion.      Comments: There is normal passive range of motion of the right foot.  There is mild tenderness to palpation of the sole of the right foot over the plantar aponeurosis.  No obvious deformity, swelling, color change.  Patient is ambulatory with limp.  Patient is wearing slip-on house shoes.     Neurological: She is alert and oriented to person, place, and time. GCS score is 15. GCS eye subscore is 4. GCS verbal subscore is 5. GCS motor subscore is 6.   Psychiatric: She has a normal mood and affect.         ED Course   Procedures  Labs Reviewed   POCT CMP - Abnormal; Notable for the following components:       Result Value    POC Chloride 109 (*)     All other components within normal limits   POCT CMP          Imaging Results              X-Ray Foot Complete Right (Final result)  Result time 04/16/24 10:05:57      Final result by Fransisco Badillo MD (04/16/24 10:05:57)                   Impression:      No evidence of acute fracture or dislocation.      Electronically signed by: Fransisco Badillo  Date:    04/16/2024  Time:    10:05                Narrative:    EXAMINATION:  XR FOOT COMPLETE 3 VIEW RIGHT    CLINICAL HISTORY:  . Pain in right foot    TECHNIQUE:  AP, lateral, and oblique views of the right foot were performed.    COMPARISON:  None    FINDINGS:  No evidence of acute fracture or dislocation.  Lisfranc joint congruent.  Joint spaces appear maintained.  No evidence of radiopaque foreign body.    Enthesopathic change at the calcaneus predominantly at plantar fascia origin.                                       Medications   dexAMETHasone injection 8 mg (8 mg Intravenous Given 4/16/24 0940)   acetaminophen tablet 650 mg (650 mg Oral Given 4/16/24 0940)     Medical Decision Making  This is an emergent evaluation of a 43-year-old female with a past medical history of hypertension, endometriosis, lupus, TIA currently on Eliquis who presents to the emergency room for evaluation of atraumatic right foot/heel pain worsening over the last month.  Reports being diagnosed with plantar fasciitis by her primary care.  Reports she was told to use ice and states no improvement.  Reports the pain is worse in the sole of her foot when getting out of bed in the morning.  She commonly wears slip-on shoes.  .    Patient looks well clinically. There is normal passive range of motion of the right foot.  There is mild tenderness to palpation of the sole of the right foot over the plantar aponeurosis.  No obvious deformity, swelling, color change.  Patient is ambulatory with limp.  Patient is wearing slip-on house shoes. Regular rate rhythm without murmurs.  No carotid bruits appreciated on exam. Lungs are clear to auscultation bilaterally.  Abdomen is soft, nontender, non distended, with normal bowel sounds.     Differential diagnosis includes but is not limited to plantar fasciitis, fracture, dislocation, sprain.  Considered septic joint but doubtful.    Workup initiated with x-ray of right foot, CMP.  Ordered Tylenol and Decadron.  Vital signs, chart, labs, and/or  imaging were all reviewed.  See ED course below and interpretations above. My overall impression is plantar fasciitis.  Educated patient about supportive shoes. Will discharge home with primary care follow-up. Patient is very well appearing, and in no acute distress. Vital signs are reassuring here in the emergency department, patient is afebrile, breathing comfortable, satting 98 % on room air. Patient/Caregiver is stable for discharge at this time.  Patient/Caregiver was informed of results and plan of care. Patient/Caregiver verbalized understanding of care plan. All questions and concerns were addressed. Discussed strict return precautions with the patient/caregiver. Instructed follow up with primary care provider within 1 week.      Tae Monae PA-C    DISCLAIMER: This note was prepared with [employer] voice recognition transcription software. Garbled syntax, mangled pronouns, and other bizarre constructions may be attributed to that software system.      Amount and/or Complexity of Data Reviewed  Labs:  Decision-making details documented in ED Course.  Radiology: ordered. Decision-making details documented in ED Course.    Risk  OTC drugs.  Prescription drug management.               ED Course as of 04/16/24 1034   Tue Apr 16, 2024   0925 BP(!): 200/129  Patient is hypertensive at 200/129.  She reports taking her home blood pressure medications today.  Reports her blood pressure is always high.  Reports her primary care provider has change her medicine several times due to this.  She has never been worked up for secondary cause.  She denies headache, weakness, chest pain, shortness on breath.  We will continue to monitor during ED course and have patient watch at home and follow-up with PCP for further management and evaluation. [TM]   1020 X-Ray Foot Complete Right  No evidence of acute fracture or dislocation.  Lisfranc joint congruent.  Joint spaces appear maintained.  No evidence of radiopaque foreign  body.     Enthesopathic change at the calcaneus predominantly at plantar fascia origin.   [TM]   1020 POCT CMP(!)  CMP with normal renal function, chloride of 109, no other electrolyte abnormalities [TM]      ED Course User Index  [TM] Tae Monae PA-C                             Clinical Impression:  Final diagnoses:  [M79.671] Right foot pain  [M72.2] Plantar fasciitis (Primary)          ED Disposition Condition    Discharge Stable          ED Prescriptions    None       Follow-up Information       Follow up With Specialties Details Why Contact Info    Yuliana Beal MD Internal Medicine, Pediatrics   [address]   [address]  [facility] [address]  [phone]      Primary care doctor  Schedule an appointment as soon as possible for a visit in 3 days               Tae Monae PA-C  04/16/24 1032

## 2024-04-16 NOTE — DISCHARGE INSTRUCTIONS
You were seen in the emergency department today for right foot pain and were diagnosed with plantar fasciitis.  Your x-ray and blood work today were normal.  You were given a steroid injection that will continue to work over the next 72 hours.  It is important that you wear supportive shoes as we discussed.  I have also attached a handout for exercises you can do to help with pain.  It is important to remember that some problems are difficult to diagnose and may not be found during your Emergency Department visit. Be sure to follow up with your primary care doctor and review all labs/imaging/tests that were performed during this visit with them. Some labs/tests may be outside of the normal range and require non-emergent follow-up and further investigation to help diagnose/exclude/prevent complications or other medical conditions. Return to the emergency department for any new or worsening symptoms. Thank you for allowing me to care for you today, it was my pleasure. I hope you get to feeling better soon!

## 2024-04-16 NOTE — Clinical Note
"Karla Guevaradanna Cruz was seen and treated in our emergency department on 4/16/2024.  She may return to work on 04/19/2024.       If you have any questions or concerns, please don't hesitate to call.      Tae Monae PA-C"

## 2024-10-05 ENCOUNTER — HOSPITAL ENCOUNTER (EMERGENCY)
Facility: HOSPITAL | Age: 44
Discharge: HOME | End: 2024-10-05
Attending: EMERGENCY MEDICINE
Payer: COMMERCIAL

## 2024-10-05 VITALS
RESPIRATION RATE: 18 BRPM | HEART RATE: 68 BPM | OXYGEN SATURATION: 99 % | BODY MASS INDEX: 30.12 KG/M2 | DIASTOLIC BLOOD PRESSURE: 86 MMHG | HEIGHT: 63 IN | WEIGHT: 170 LBS | SYSTOLIC BLOOD PRESSURE: 155 MMHG | TEMPERATURE: 98 F

## 2024-10-05 DIAGNOSIS — L30.9 DERMATITIS: Primary | ICD-10-CM

## 2024-10-05 DIAGNOSIS — I10 HYPERTENSION, UNSPECIFIED TYPE: ICD-10-CM

## 2024-10-05 PROCEDURE — 63600175 PHARM REV CODE 636 W HCPCS: Mod: ER | Performed by: EMERGENCY MEDICINE

## 2024-10-05 PROCEDURE — 25000003 PHARM REV CODE 250: Mod: ER | Performed by: EMERGENCY MEDICINE

## 2024-10-05 PROCEDURE — 99284 EMERGENCY DEPT VISIT MOD MDM: CPT | Mod: 25,ER

## 2024-10-05 PROCEDURE — 96372 THER/PROPH/DIAG INJ SC/IM: CPT | Performed by: EMERGENCY MEDICINE

## 2024-10-05 RX ORDER — HYDROCHLOROTHIAZIDE 25 MG/1
25 TABLET ORAL
Status: COMPLETED | OUTPATIENT
Start: 2024-10-05 | End: 2024-10-05

## 2024-10-05 RX ORDER — AMLODIPINE BESYLATE 5 MG/1
10 TABLET ORAL
Status: COMPLETED | OUTPATIENT
Start: 2024-10-05 | End: 2024-10-05

## 2024-10-05 RX ORDER — DESOXIMETASONE 0.5 MG/G
CREAM TOPICAL 2 TIMES DAILY
Qty: 60 G | Refills: 0 | Status: SHIPPED | OUTPATIENT
Start: 2024-10-05

## 2024-10-05 RX ORDER — CETIRIZINE HYDROCHLORIDE 10 MG/1
10 TABLET ORAL DAILY
Qty: 14 TABLET | Refills: 0 | Status: SHIPPED | OUTPATIENT
Start: 2024-10-05 | End: 2024-10-19

## 2024-10-05 RX ORDER — DEXAMETHASONE SODIUM PHOSPHATE 4 MG/ML
8 INJECTION, SOLUTION INTRA-ARTICULAR; INTRALESIONAL; INTRAMUSCULAR; INTRAVENOUS; SOFT TISSUE
Status: COMPLETED | OUTPATIENT
Start: 2024-10-05 | End: 2024-10-05

## 2024-10-05 RX ADMIN — DEXAMETHASONE SODIUM PHOSPHATE 8 MG: 4 INJECTION INTRA-ARTICULAR; INTRALESIONAL; INTRAMUSCULAR; INTRAVENOUS; SOFT TISSUE at 10:10

## 2024-10-05 RX ADMIN — AMLODIPINE BESYLATE 10 MG: 5 TABLET ORAL at 10:10

## 2024-10-05 RX ADMIN — HYDROCHLOROTHIAZIDE 25 MG: 25 TABLET ORAL at 10:10

## 2024-10-05 NOTE — ED PROVIDER NOTES
"Encounter Date: 10/5/2024    SCRIBE #1 NOTE: I, Vita Walden, am scribing for, and in the presence of,  Albertina Greenberg MD. I have scribed the following portions of the note - Other sections scribed: HPI, ROS, PE, MDM.       History     Chief Complaint   Patient presents with    Rash     Rash to bilateral arms x 2 weeks. "Sometimes it itches." Pt has been unable to get into her PCP.  States her next available appointment is not for 1 year.     43 y.o. F with Pmhx of lupus, HTN, endometriosis, stroke presents to the ED with a chief complaint of a rash to her bilat arms that began two weeks ago. 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 She reports using an OTC cream on the rash areas without relief. Denies a history of eczema. No other exacerbating or alleviating factors. Patient denies any airway involvement, rhinorrhea, redness or discharge from the eyes, or other associated symptoms. Notes that she was recently prescribed a new medication for her HTN, hasn't taken yet d/t issues with pharmacy availability.     Patient is requesting a referral for primary care.     The history is provided by the patient. No  was used.     Review of patient's allergies indicates:  No Known Allergies  Past Medical History:   Diagnosis Date    Endometriosis     Hypertension     Lupus      Past Surgical History:   Procedure Laterality Date     SECTION      x2    CHOLECYSTECTOMY      HAND SURGERY      HYSTERECTOMY      partial done for AUB     No family history on file.  Social History     Tobacco Use    Smoking status: Every Day     Current packs/day: 0.25     Types: Cigarettes   Substance Use Topics    Alcohol use: No    Drug use: No     Review of Systems   Constitutional:  Negative for activity change, appetite change, chills and fever.   HENT:  Negative for congestion, rhinorrhea, " sneezing and sore throat.    Eyes:  Negative for discharge and redness.   Respiratory:  Negative for cough, choking, shortness of breath and wheezing.    Cardiovascular:  Negative for chest pain and palpitations.   Gastrointestinal:  Negative for abdominal pain, diarrhea, nausea and vomiting.   Skin:  Positive for rash (bilat arms (+) intermittent itchiness).   Neurological:  Negative for dizziness, syncope, light-headedness and headaches.   All other systems reviewed and are negative.      Physical Exam     Initial Vitals [10/05/24 0859]   BP Pulse Resp Temp SpO2   (!) 182/105 75 18 97.8 °F (36.6 °C) 100 %      MAP       --         Physical Exam    Nursing note and vitals reviewed.  Constitutional: She appears well-developed and well-nourished. No distress.   HENT:   Head: Normocephalic and atraumatic.   Eyes: Conjunctivae are normal.   Neck:   Normal range of motion.  Cardiovascular:  Normal rate, regular rhythm and normal heart sounds.           No murmur heard.  Pulmonary/Chest: Breath sounds normal. No respiratory distress.   Abdominal: Abdomen is soft. Bowel sounds are normal. She exhibits no distension. There is no abdominal tenderness.   Musculoskeletal:         General: No tenderness or edema. Normal range of motion.      Cervical back: Normal range of motion.     Neurological: She is alert and oriented to person, place, and time. GCS score is 15. GCS eye subscore is 4. GCS verbal subscore is 5. GCS motor subscore is 6.   Skin: Skin is warm and dry.   Fine, papular, flesh-colored rash on posterior forearms bilaterally. No mucous membrane involvement.    Psychiatric: She has a normal mood and affect. Her behavior is normal.         ED Course   Procedures  Labs Reviewed - No data to display         Imaging Results    None          Medications   dexAMETHasone injection 8 mg (8 mg Intramuscular Given 10/5/24 1004)   hydroCHLOROthiazide tablet 25 mg (25 mg Oral Given 10/5/24 1003)   amLODIPine tablet 10 mg (10  mg Oral Given 10/5/24 1003)     Medical Decision Making  43 y.o. F with a PMHx of lupus, HTN, stroke, endoemtriosis presents to the ED with a chief complaint of a rash to her bilateral arms that began two weeks ago.    On exam - Patient had a fine, papular, flesh-colored rash to her posterior forearms bilaterally. No mucous membrane involvement.       In shared decision making with pt, I will treat rash with steroid injection, steroid topical, and oral antihistamine for itching.     Her BP is elevated. Pt states she saw her doctor for HBP 1 week and 2 medications were ordered but the pharmacy has not gotten them in yet. Norvasc and hydrochlorothiazide were given in ER. PT will try to transfer meds to another pharmacy.    Risk  OTC drugs.  Prescription drug management.            Scribe Attestation:   Scribe #1: I performed the above scribed service and the documentation accurately describes the services I performed. I attest to the accuracy of the note.                                 Clinical Impression:  Final diagnoses:  [L30.9] Dermatitis (Primary)  [I10] Hypertension, unspecified type          ED Disposition Condition    Discharge           ED Prescriptions       Medication Sig Dispense Start Date End Date Auth. Provider    desoximetasone (TOPICORT) 0.05 % cream Apply topically 2 (two) times daily. 60 g 10/5/2024 -- Albertina Greenberg MD    cetirizine (ZYRTEC) 10 MG tablet Take 1 tablet (10 mg total) by mouth once daily. for 14 days 14 tablet 10/5/2024 10/19/2024 Albertina Greenberg MD          Follow-up Information       Follow up With Specialties Details Why Contact Info Additional Information    [facility] Family Medicine Family Medicine Schedule an appointment as soon as possible for a visit   [address] [facility] [address] [phone] [address]nd Floor    Naveed Delcid MD Internal Medicine, Wound Care Schedule an appointment as soon as possible for a visit   [address] [facility] [facility]  [place]  [address]  [phone]       [facility] DERMATOLOGY Dermatology  for rash [address] [facility] [place] [address] [phone]           I, Dr. Albertina Greenberg, personally performed the services described in this documentation.   All medical record entries made by the scribe were at my direction and in my presence.   I have reviewed the chart and agree that the record is accurate and complete.   Albertina Greenberg MD.  9:55 AM 10/05/2024        Albertina Greenberg MD  10/05/24 2998

## 2024-10-05 NOTE — ED NOTES
"Karla Cruz, a 43 y.o. female presents to the ED w/ complaint of rash on bilateral arms x 2 weeks.       Chief Complaint   Patient presents with    Rash     Rash to bilateral arms x 2 weeks. "Sometimes it itches." Pt has been unable to get into her PCP.  States her next available appointment is not for 1 year.     Review of patient's allergies indicates:  No Known Allergies  Past Medical History:   Diagnosis Date    Endometriosis     Hypertension     Lupus      "

## 2024-10-05 NOTE — DISCHARGE INSTRUCTIONS
Use mild soap like Dove. Apply cream 2 times a day. Take zyrtec daily for itching. See dermatology if you are not better with this treatment.